# Patient Record
Sex: FEMALE | Race: BLACK OR AFRICAN AMERICAN | Employment: UNEMPLOYED | URBAN - METROPOLITAN AREA
[De-identification: names, ages, dates, MRNs, and addresses within clinical notes are randomized per-mention and may not be internally consistent; named-entity substitution may affect disease eponyms.]

---

## 2019-02-15 ENCOUNTER — HOSPITAL ENCOUNTER (EMERGENCY)
Age: 39
Discharge: HOME | End: 2019-02-16
Attending: EMERGENCY MEDICINE
Payer: COMMERCIAL

## 2019-02-15 VITALS
HEIGHT: 63 IN | RESPIRATION RATE: 18 BRPM | SYSTOLIC BLOOD PRESSURE: 146 MMHG | HEART RATE: 91 BPM | WEIGHT: 156.97 LBS | BODY MASS INDEX: 27.81 KG/M2 | TEMPERATURE: 98.3 F | OXYGEN SATURATION: 98 % | DIASTOLIC BLOOD PRESSURE: 98 MMHG

## 2019-02-15 DIAGNOSIS — F41.0 PANIC ATTACK: Primary | ICD-10-CM

## 2019-02-15 PROCEDURE — 99284 EMERGENCY DEPT VISIT MOD MDM: CPT

## 2019-02-16 ENCOUNTER — APPOINTMENT (OUTPATIENT)
Dept: GENERAL RADIOLOGY | Age: 39
End: 2019-02-16
Attending: EMERGENCY MEDICINE
Payer: COMMERCIAL

## 2019-02-16 LAB
ALBUMIN SERPL-MCNC: 4.5 G/DL (ref 3.5–5)
ALBUMIN/GLOB SERPL: 1 {RATIO} (ref 1.1–2.2)
ALP SERPL-CCNC: 47 U/L (ref 45–117)
ALT SERPL-CCNC: 29 U/L (ref 12–78)
ANION GAP SERPL CALC-SCNC: 8 MMOL/L (ref 5–15)
APPEARANCE UR: CLEAR
AST SERPL-CCNC: 17 U/L (ref 15–37)
ATRIAL RATE: 74 BPM
BACTERIA URNS QL MICRO: ABNORMAL /HPF
BASOPHILS # BLD: 0 K/UL (ref 0–0.1)
BASOPHILS NFR BLD: 0 % (ref 0–1)
BILIRUB SERPL-MCNC: 0.3 MG/DL (ref 0.2–1)
BILIRUB UR QL: NEGATIVE
BUN SERPL-MCNC: 4 MG/DL (ref 6–20)
BUN/CREAT SERPL: 5 (ref 12–20)
CALCIUM SERPL-MCNC: 9.2 MG/DL (ref 8.5–10.1)
CALCULATED P AXIS, ECG09: 57 DEGREES
CALCULATED R AXIS, ECG10: 13 DEGREES
CALCULATED T AXIS, ECG11: 30 DEGREES
CHLORIDE SERPL-SCNC: 106 MMOL/L (ref 97–108)
CK SERPL-CCNC: 274 U/L (ref 26–192)
CO2 SERPL-SCNC: 26 MMOL/L (ref 21–32)
COLOR UR: ABNORMAL
CREAT SERPL-MCNC: 0.76 MG/DL (ref 0.55–1.02)
DIAGNOSIS, 93000: NORMAL
DIFFERENTIAL METHOD BLD: NORMAL
EOSINOPHIL # BLD: 0.1 K/UL (ref 0–0.4)
EOSINOPHIL NFR BLD: 1 % (ref 0–7)
EPITH CASTS URNS QL MICRO: ABNORMAL /LPF
ERYTHROCYTE [DISTWIDTH] IN BLOOD BY AUTOMATED COUNT: 13.1 % (ref 11.5–14.5)
GLOBULIN SER CALC-MCNC: 4.4 G/DL (ref 2–4)
GLUCOSE SERPL-MCNC: 95 MG/DL (ref 65–100)
GLUCOSE UR STRIP.AUTO-MCNC: NEGATIVE MG/DL
HCT VFR BLD AUTO: 39.6 % (ref 35–47)
HGB BLD-MCNC: 13.2 G/DL (ref 11.5–16)
HGB UR QL STRIP: NEGATIVE
IMM GRANULOCYTES # BLD AUTO: 0 K/UL (ref 0–0.04)
IMM GRANULOCYTES NFR BLD AUTO: 0 % (ref 0–0.5)
KETONES UR QL STRIP.AUTO: NEGATIVE MG/DL
LEUKOCYTE ESTERASE UR QL STRIP.AUTO: ABNORMAL
LYMPHOCYTES # BLD: 2.6 K/UL (ref 0.8–3.5)
LYMPHOCYTES NFR BLD: 42 % (ref 12–49)
MAGNESIUM SERPL-MCNC: 2.1 MG/DL (ref 1.6–2.4)
MCH RBC QN AUTO: 29.5 PG (ref 26–34)
MCHC RBC AUTO-ENTMCNC: 33.3 G/DL (ref 30–36.5)
MCV RBC AUTO: 88.4 FL (ref 80–99)
MONOCYTES # BLD: 0.4 K/UL (ref 0–1)
MONOCYTES NFR BLD: 6 % (ref 5–13)
NEUTS SEG # BLD: 3 K/UL (ref 1.8–8)
NEUTS SEG NFR BLD: 50 % (ref 32–75)
NITRITE UR QL STRIP.AUTO: NEGATIVE
NRBC # BLD: 0 K/UL (ref 0–0.01)
NRBC BLD-RTO: 0 PER 100 WBC
P-R INTERVAL, ECG05: 136 MS
PH UR STRIP: 5.5 [PH] (ref 5–8)
PLATELET # BLD AUTO: 371 K/UL (ref 150–400)
PMV BLD AUTO: 9.5 FL (ref 8.9–12.9)
POTASSIUM SERPL-SCNC: 3.5 MMOL/L (ref 3.5–5.1)
PROT SERPL-MCNC: 8.9 G/DL (ref 6.4–8.2)
PROT UR STRIP-MCNC: NEGATIVE MG/DL
Q-T INTERVAL, ECG07: 426 MS
QRS DURATION, ECG06: 88 MS
QTC CALCULATION (BEZET), ECG08: 472 MS
RBC # BLD AUTO: 4.48 M/UL (ref 3.8–5.2)
RBC #/AREA URNS HPF: ABNORMAL /HPF (ref 0–5)
SODIUM SERPL-SCNC: 140 MMOL/L (ref 136–145)
SP GR UR REFRACTOMETRY: 1.02 (ref 1–1.03)
TROPONIN I SERPL-MCNC: <0.05 NG/ML
UA: UC IF INDICATED,UAUC: ABNORMAL
UROBILINOGEN UR QL STRIP.AUTO: 0.2 EU/DL (ref 0.2–1)
VENTRICULAR RATE, ECG03: 74 BPM
WBC # BLD AUTO: 6 K/UL (ref 3.6–11)
WBC URNS QL MICRO: ABNORMAL /HPF (ref 0–4)

## 2019-02-16 PROCEDURE — 74011250636 HC RX REV CODE- 250/636: Performed by: EMERGENCY MEDICINE

## 2019-02-16 PROCEDURE — 96372 THER/PROPH/DIAG INJ SC/IM: CPT

## 2019-02-16 PROCEDURE — 74011250637 HC RX REV CODE- 250/637: Performed by: EMERGENCY MEDICINE

## 2019-02-16 PROCEDURE — 36415 COLL VENOUS BLD VENIPUNCTURE: CPT

## 2019-02-16 PROCEDURE — 71046 X-RAY EXAM CHEST 2 VIEWS: CPT

## 2019-02-16 PROCEDURE — 87086 URINE CULTURE/COLONY COUNT: CPT

## 2019-02-16 PROCEDURE — 85025 COMPLETE CBC W/AUTO DIFF WBC: CPT

## 2019-02-16 PROCEDURE — 84484 ASSAY OF TROPONIN QUANT: CPT

## 2019-02-16 PROCEDURE — 80053 COMPREHEN METABOLIC PANEL: CPT

## 2019-02-16 PROCEDURE — 81001 URINALYSIS AUTO W/SCOPE: CPT

## 2019-02-16 PROCEDURE — 82550 ASSAY OF CK (CPK): CPT

## 2019-02-16 PROCEDURE — 83735 ASSAY OF MAGNESIUM: CPT

## 2019-02-16 PROCEDURE — 93005 ELECTROCARDIOGRAM TRACING: CPT

## 2019-02-16 RX ORDER — HYDROXYZINE 25 MG/1
25 TABLET, FILM COATED ORAL
Status: COMPLETED | OUTPATIENT
Start: 2019-02-16 | End: 2019-02-16

## 2019-02-16 RX ORDER — LORAZEPAM 2 MG/ML
1 INJECTION INTRAMUSCULAR
Status: COMPLETED | OUTPATIENT
Start: 2019-02-16 | End: 2019-02-16

## 2019-02-16 RX ORDER — HYDROXYZINE 50 MG/1
50 TABLET, FILM COATED ORAL
Qty: 20 TAB | Refills: 0 | Status: SHIPPED | OUTPATIENT
Start: 2019-02-16 | End: 2019-02-26

## 2019-02-16 RX ADMIN — LORAZEPAM 1 MG: 2 INJECTION INTRAMUSCULAR; INTRAVENOUS at 02:06

## 2019-02-16 RX ADMIN — HYDROXYZINE HYDROCHLORIDE 25 MG: 25 TABLET, FILM COATED ORAL at 01:20

## 2019-02-16 NOTE — ED NOTES
Dr. Kalee French reviewed discharge instructions with the patient. The patient verbalized understanding. All questions and concerns were addressed. The patient declined a wheelchair and is discharged ambulatory in the care of family members with instructions and prescriptions in hand. Pt is alert and oriented x 4. Respirations are clear and unlabored.

## 2019-02-16 NOTE — ED PROVIDER NOTES
EMERGENCY DEPARTMENT HISTORY AND PHYSICAL EXAM 
 
 
Date: 2/15/2019 Patient Name: Francisco Javier King History of Presenting Illness Chief Complaint Patient presents with  Mental Health Problem  
  patient states has been hallucinating & having h/a & feeling exhausted started today History Provided By: Patient HPI: Francisco Javier King, 45 y.o. female with PMHx significant for bipolar disorder, cholecystectomy, tubal ligation, presents ambulatory to the ED with cc of sudden onset \"tingling\" sensation throughout her body with SOB that began this morning. The pt reports these episodes last a few minutes at a time and have been intermittent throughout the day. She specifically denies any CP, cough, changes in diet, V/D, leg pain or swelling, SI and HI. Social Hx: - Tobacco, - EtOH, - Illicit Drugs Family Hx: None There are no other complaints, changes, or physical findings at this time. PCP: Shayla Betts MD 
 
No current facility-administered medications on file prior to encounter. Current Outpatient Medications on File Prior to Encounter Medication Sig Dispense Refill  oxyCODONE-acetaminophen (PERCOCET) 5-325 mg per tablet Take 1 Tab by mouth every four (4) hours as needed for Pain. Max Daily Amount: 6 Tabs. 60 Tab 0  
 lamoTRIgine (LAMICTAL) 100 mg tablet Take 100 mg by mouth two (2) times a day.  buPROPion XL (WELLBUTRIN XL) 150 mg tablet Take 150 mg by mouth every morning. Past History Past Medical History: 
Past Medical History:  
Diagnosis Date  Bipolar 1 disorder ([address]   
 as of 5/23/16 pt states under control Past Surgical History: 
Past Surgical History:  
Procedure Laterality Date  HX CHOLECYSTECTOMY  12-25-15  
 lap giovanni with grams-[facility]-DR. Fernando Bowman  HX OTHER SURGICAL  2015 Tummy Tuck  HX TUBAL LIGATION Family History: No family history on file. Social History: 
Social History Tobacco Use  Smoking status: Never Smoker  Smokeless tobacco: Never Used Substance Use Topics  Alcohol use: No  
  Alcohol/week: 0.0 oz  Drug use: No  
 
 
Allergies: 
No Known Allergies Review of Systems Review of Systems Constitutional: Positive for fatigue. Negative for appetite change. HENT: Negative for congestion. Eyes: Negative. Respiratory: Positive for shortness of breath. Negative for cough. Cardiovascular: Negative for chest pain and palpitations. Gastrointestinal: Negative for diarrhea and vomiting. Endocrine: Negative for heat intolerance. Genitourinary: Negative. Musculoskeletal: Negative for myalgias. Skin: Negative for rash. Allergic/Immunologic: Negative for immunocompromised state. Neurological: Negative for dizziness. Hematological: Does not bruise/bleed easily. Psychiatric/Behavioral: Negative. Negative for hallucinations and suicidal ideas. All other systems reviewed and are negative. Physical Exam  
Physical Exam  
Constitutional: She is oriented to person, place, and time. She appears well-developed and well-nourished. No distress. HENT:  
Head: Normocephalic and atraumatic. Eyes: EOM are normal. Pupils are equal, round, and reactive to light. Neck: Normal range of motion. Neck supple. Cardiovascular: Normal rate, regular rhythm and normal heart sounds. Pulmonary/Chest: Effort normal and breath sounds normal. No respiratory distress. Abdominal: Soft. Bowel sounds are normal. She exhibits no mass. There is no tenderness. Musculoskeletal: Normal range of motion. She exhibits no edema. Neurological: She is alert and oriented to person, place, and time. Coordination normal.  
Sensory and motor intact. Skin: Skin is warm and dry. Psychiatric: She has a normal mood and affect. Her behavior is normal.  
Nursing note and vitals reviewed. Diagnostic Study Results Labs - Recent Results (from the past 12 hour(s)) EKG, 12 LEAD, INITIAL Collection Time: 02/16/19 12:03 AM  
Result Value Ref Range Ventricular Rate 74 BPM  
 Atrial Rate 74 BPM  
 P-R Interval 136 ms QRS Duration 88 ms Q-T Interval 426 ms  
 QTC Calculation (Bezet) 472 ms Calculated P Axis 57 degrees Calculated R Axis 13 degrees Calculated T Axis 30 degrees Diagnosis Normal sinus rhythm Possible Left atrial enlargement No previous ECGs available CBC WITH AUTOMATED DIFF Collection Time: 02/16/19 12:06 AM  
Result Value Ref Range WBC 6.0 3.6 - 11.0 K/uL  
 RBC 4.48 3.80 - 5.20 M/uL  
 HGB 13.2 11.5 - 16.0 g/dL HCT 39.6 35.0 - 47.0 % MCV 88.4 80.0 - 99.0 FL  
 MCH 29.5 26.0 - 34.0 PG  
 MCHC 33.3 30.0 - 36.5 g/dL  
 RDW 13.1 11.5 - 14.5 % PLATELET 974 569 - 240 K/uL MPV 9.5 8.9 - 12.9 FL  
 NRBC 0.0 0  WBC ABSOLUTE NRBC 0.00 0.00 - 0.01 K/uL NEUTROPHILS 50 32 - 75 % LYMPHOCYTES 42 12 - 49 % MONOCYTES 6 5 - 13 % EOSINOPHILS 1 0 - 7 % BASOPHILS 0 0 - 1 % IMMATURE GRANULOCYTES 0 0.0 - 0.5 % ABS. NEUTROPHILS 3.0 1.8 - 8.0 K/UL  
 ABS. LYMPHOCYTES 2.6 0.8 - 3.5 K/UL  
 ABS. MONOCYTES 0.4 0.0 - 1.0 K/UL  
 ABS. EOSINOPHILS 0.1 0.0 - 0.4 K/UL  
 ABS. BASOPHILS 0.0 0.0 - 0.1 K/UL  
 ABS. IMM. GRANS. 0.0 0.00 - 0.04 K/UL  
 DF AUTOMATED METABOLIC PANEL, COMPREHENSIVE Collection Time: 02/16/19 12:06 AM  
Result Value Ref Range Sodium 140 136 - 145 mmol/L Potassium 3.5 3.5 - 5.1 mmol/L Chloride 106 97 - 108 mmol/L  
 CO2 26 21 - 32 mmol/L Anion gap 8 5 - 15 mmol/L Glucose 95 65 - 100 mg/dL BUN 4 (L) 6 - 20 MG/DL Creatinine 0.76 0.55 - 1.02 MG/DL  
 BUN/Creatinine ratio 5 (L) 12 - 20 GFR est AA >60 >60 ml/min/1.73m2 GFR est non-AA >60 >60 ml/min/1.73m2 Calcium 9.2 8.5 - 10.1 MG/DL Bilirubin, total 0.3 0.2 - 1.0 MG/DL  
 ALT (SGPT) 29 12 - 78 U/L  
 AST (SGOT) 17 15 - 37 U/L Alk. phosphatase 47 45 - 117 U/L Protein, total 8.9 (H) 6.4 - 8.2 g/dL Albumin 4.5 3.5 - 5.0 g/dL Globulin 4.4 (H) 2.0 - 4.0 g/dL A-G Ratio 1.0 (L) 1.1 - 2.2 URINALYSIS W/ REFLEX CULTURE Collection Time: 02/16/19 12:06 AM  
Result Value Ref Range Color YELLOW/STRAW Appearance CLEAR CLEAR Specific gravity 1.016 1.003 - 1.030    
 pH (UA) 5.5 5.0 - 8.0 Protein NEGATIVE  NEG mg/dL Glucose NEGATIVE  NEG mg/dL Ketone NEGATIVE  NEG mg/dL Bilirubin NEGATIVE  NEG Blood NEGATIVE  NEG Urobilinogen 0.2 0.2 - 1.0 EU/dL Nitrites NEGATIVE  NEG Leukocyte Esterase SMALL (A) NEG    
 WBC 0-4 0 - 4 /hpf  
 RBC 0-5 0 - 5 /hpf Epithelial cells FEW FEW /lpf Bacteria 1+ (A) NEG /hpf  
 UA:UC IF INDICATED URINE CULTURE ORDERED (A) CNI    
CK Collection Time: 02/16/19 12:06 AM  
Result Value Ref Range  (H) 26 - 192 U/L  
MAGNESIUM Collection Time: 02/16/19 12:06 AM  
Result Value Ref Range Magnesium 2.1 1.6 - 2.4 mg/dL TROPONIN I Collection Time: 02/16/19 12:06 AM  
Result Value Ref Range Troponin-I, Qt. <0.05 <0.05 ng/mL Radiologic Studies - CXR Results  (Last 48 hours) 02/16/19 0032  XR CHEST PA LAT Final result Impression:  IMPRESSION: No acute intrathoracic disease. Narrative:  CLINICAL HISTORY: Dyspnea INDICATION: Dyspnea COMPARISON: None FINDINGS:   
PA and lateral views of the chest are obtained. The cardiopericardial silhouette is within normal limits. There is no pleural  
effusion, pneumothorax or focal consolidation present. Medical Decision Making I am the first provider for this patient. I reviewed the vital signs, available nursing notes, past medical history, past surgical history, family history and social history. Vital Signs-Reviewed the patient's vital signs. Patient Vitals for the past 12 hrs: 
 Temp Pulse Resp BP SpO2  
02/15/19 2221 98.3 °F (36.8 °C) 91 18 (!) 146/98 98 % Pulse Oximetry Analysis - 98% on RA 
 
 Cardiac Monitor:  
Rate: 91 bpm 
Rhythm: Normal Sinus Rhythm EKG interpretation: (Preliminary 00:03) Rhythm: normal sinus rhythm; and regular . Rate (approx.): 74; Axis: normal; AL interval: normal; QRS interval: normal ; ST/T wave: normal; Other findings: Possible left atrial enlargement. No prior EKG's. Written by CEZAR Benavides, as dictated by Randall Ventura MD. Records Reviewed: Nursing Notes and Old Medical Records Provider Notes (Medical Decision Making): DDx: Panic attack, electrolyte abnormality, anxiety, dehydration, pleurisy ED Course:  
Initial assessment performed. The patients presenting problems have been discussed, and they are in agreement with the care plan formulated and outlined with them. I have encouraged them to ask questions as they arise throughout their visit. PROGRESS NOTE: 
2:33 AM 
On re-evaluation, the pt notes her pain has still not improved. Written by CEZAR Benavides, as dictated by Randall Ventura MD. 
 
PROGRESS NOTE: 
2:55 AM 
Pt states her pain has improved. Will discharge. Written by CEZAR Benavides, as dictated by Randall Ventura MD. Critical Care Time:  
none Disposition: 
Discharge Note: 
2:55 AM 
The patient has been re-evaluated and is ready for discharge. Reviewed available results with patient. Counseled patient on diagnosis and care plan. Patient has expressed understanding, and all questions have been answered. Patient agrees with plan and agrees to follow up as recommended, or return to the ED if their symptoms worsen. Discharge instructions have been provided and explained to the patient, along with reasons to return to the ED. PLAN: 
1. Discharge Current Discharge Medication List  
  
START taking these medications Details  
hydrOXYzine HCl (ATARAX) 50 mg tablet Take 1 Tab by mouth every six (6) hours as needed for Itching for up to 10 days. Qty: 20 Tab, Refills: 0  
  
  
 
2. Follow-up Information Follow up With Specialties Details Why Contact Info Iam Turner MD Internal Medicine Call in 2 days  [address] [facility] 
[address] [id number] 
[phone] [facility] EMERGENCY DEPT Emergency Medicine  If symptoms worsen [address] [facility] 
[phone] Return to ED if worse Diagnosis Clinical Impression: 1. Panic attack Attestations: This note is prepared by Helga Hui, acting as Scribe for MD David Puentes MD: The scribe's documentation has been prepared under my direction and personally reviewed by me in its entirety. I confirm that the note above accurately reflects all work, treatment, procedures, and medical decision making performed by me. - Patient with enlarged prostate on CT A/P  - cont tamsulosin & finasteride  - Good urine outpt w/o bryson

## 2019-02-17 LAB
BACTERIA SPEC CULT: NORMAL
CC UR VC: NORMAL
SERVICE CMNT-IMP: NORMAL